# Patient Record
Sex: FEMALE | Race: WHITE | NOT HISPANIC OR LATINO | URBAN - NONMETROPOLITAN AREA
[De-identification: names, ages, dates, MRNs, and addresses within clinical notes are randomized per-mention and may not be internally consistent; named-entity substitution may affect disease eponyms.]

---

## 2017-11-17 NOTE — PATIENT DISCUSSION
(E11.9) Type 2 diabetes mellitus without complications - Assesment : Examination reveals Type 2 Diabetes mellitus without ocular complications. - Plan : Continue following with PCP for routine care. Stressed importance of keeping A1c levels below 7.0 and monitoring blood sugar. Letter explaining today's findings faxed to patient's PCP. RV 1 Year for Complete Exam, or sooner if having problems or changes in vision.

## 2017-11-17 NOTE — PATIENT DISCUSSION
(H02.411) Mechanical ptosis of right eyelid - Assesment : Examination revealed mechanical ptosis of the eyelid. - Plan : Monitor for changes.

## 2017-11-17 NOTE — PATIENT DISCUSSION
(H02.412) Mechanical ptosis of left eyelid - Assesment : Examination revealed mechanical ptosis of the eyelid. - Plan : Monitor for changes.

## 2018-02-09 PROBLEM — H25.13: Noted: 2019-02-15

## 2018-02-09 PROBLEM — H52.03: Noted: 2018-02-09

## 2018-02-09 PROBLEM — H52.4: Noted: 2018-02-09

## 2018-02-09 PROBLEM — H52.223: Noted: 2018-02-09

## 2019-01-28 NOTE — PATIENT DISCUSSION
(E11.9) Type 2 diabetes mellitus without complications - Assesment : Examination reveals Type 2 Diabetes mellitus without ocular complications. NO DIABETIC RETINOPATHY PRESENT TODAY - Plan : Continue following with PCP for routine care. Stressed importance of keeping A1c levels below 7.0 and monitoring blood sugar.  Letter explaining today's findings faxed to patient's PCP.    1 YEAR EXAM

## 2019-01-28 NOTE — PATIENT DISCUSSION
(H01.001) Unspecified blepharitis right upper eyelid - Assesment : Examination revealed Blepharitis. - Plan : Warm soaks with massage to eyelid two times daily.  LID [employer]ER

## 2019-01-28 NOTE — PATIENT DISCUSSION
(Z77.620) Other secondary cataract, left eye - Assesment : Significant posterior capsule opacification present. - Plan : Monitor for Changes. Advised patient to call our office with decreased vision or increased symptoms.

## 2019-01-28 NOTE — PATIENT DISCUSSION
(H01.004) Unspecified blepharitis left upper eyelid - Assesment : Examination revealed Blepharitis. - Plan : Warm soaks with massage to eyelid two times daily.  SEE PLAN 2

## 2019-02-15 ENCOUNTER — IMPORTED ENCOUNTER (OUTPATIENT)
Dept: URBAN - NONMETROPOLITAN AREA CLINIC 1 | Facility: CLINIC | Age: 64
End: 2019-02-15

## 2019-02-15 PROCEDURE — 92310 CONTACT LENS FITTING OU: CPT

## 2019-02-15 PROCEDURE — S0621 ROUTINE OPHTHALMOLOGICAL EXA: HCPCS

## 2019-02-15 NOTE — PATIENT DISCUSSION
Hyperopia/astig/presby-  Discussed refractive status in detail w/ pt today-  New GLRx given today-  Previous hx of RK surgery with incisions noted. -  CL trials will be ordered and she will follow up with Ashley to complete fitting once trials are here.  -  Continue to monitor Cataracts OU mild-  Discussed diagnosis in detail with patient-  Discussed signs and symptoms associated-  Recommend UV protection -  Not visually significant at this time-  Continue to monitor; 's Notes: MR  11/15/16DFE  11/15/16

## 2019-02-27 ENCOUNTER — IMPORTED ENCOUNTER (OUTPATIENT)
Dept: URBAN - NONMETROPOLITAN AREA CLINIC 1 | Facility: CLINIC | Age: 64
End: 2019-02-27

## 2019-08-13 ENCOUNTER — IMPORTED ENCOUNTER (OUTPATIENT)
Dept: URBAN - NONMETROPOLITAN AREA CLINIC 1 | Facility: CLINIC | Age: 64
End: 2019-08-13

## 2019-08-13 PROBLEM — H52.4: Noted: 2019-08-13

## 2019-08-13 PROBLEM — H00.025: Noted: 2019-08-13

## 2019-08-13 PROBLEM — H25.13: Noted: 2019-08-13

## 2019-08-13 PROBLEM — H52.223: Noted: 2019-08-13

## 2019-08-13 PROBLEM — H52.03: Noted: 2019-08-13

## 2019-08-13 PROCEDURE — 99213 OFFICE O/P EST LOW 20 MIN: CPT

## 2019-08-13 NOTE — PATIENT DISCUSSION
Internal Hordeolum LLL - Discussed diagnosis in detail with patient - Start HOT compresses 10-15 mins on and 45 mins off- Start Keflex 500mg TID x 7 days. [facility]- If no improvement will derricker neda carreon with Dr. Ibeth Chambers - Continue to monitor ---------------------------previous notes------------------------Hyperopia/astig/presby-  Discussed refractive status in detail w/ pt today-  New GLRx given today-  Previous hx of RK surgery with incisions noted. -  CL trials will be ordered and she will follow up with Ashley to complete fitting once trials are here.  -  Continue to monitor Cataracts OU mild-  Discussed diagnosis in detail with patient-  Discussed signs and symptoms associated-  Recommend UV protection -  Not visually significant at this time-  Continue to monitor; 's Notes: MR  11/15/16DFE  11/15/16

## 2020-09-08 ENCOUNTER — IMPORTED ENCOUNTER (OUTPATIENT)
Dept: URBAN - NONMETROPOLITAN AREA CLINIC 1 | Facility: CLINIC | Age: 65
End: 2020-09-08

## 2020-09-08 PROBLEM — H25.813: Noted: 2020-09-08

## 2020-09-08 PROBLEM — H52.4: Noted: 2020-09-08

## 2020-09-08 PROCEDURE — 92015 DETERMINE REFRACTIVE STATE: CPT

## 2020-09-08 PROCEDURE — 92014 COMPRE OPH EXAM EST PT 1/>: CPT

## 2020-09-08 PROCEDURE — 92310 CONTACT LENS FITTING OU: CPT

## 2020-09-08 NOTE — PATIENT DISCUSSION
Hyperopia/astig/presby- Discussed refractive status in detail w/ pt today- New glasses and CL RX given today MR re-checked by Dr. Idolina Nissen today- Previous hx of RK surgery with incisions noted.  - Continue to monitor Cataracts OU- Discussed diagnosis in detail with patient- Discussed signs and symptoms associated- Recommend UV protection - No treatment needed at this time - Continue to monitor; Dr's Notes: MR  11/15/16DFNELI  11/15/16

## 2021-05-24 ENCOUNTER — IMPORTED ENCOUNTER (OUTPATIENT)
Dept: URBAN - NONMETROPOLITAN AREA CLINIC 1 | Facility: CLINIC | Age: 66
End: 2021-05-24

## 2021-05-24 PROBLEM — H25.813: Noted: 2021-05-24

## 2021-05-24 PROBLEM — H53.2: Noted: 2021-05-24

## 2021-05-24 PROBLEM — H52.4: Noted: 2021-05-24

## 2021-05-24 PROCEDURE — 99213 OFFICE O/P EST LOW 20 MIN: CPT

## 2021-05-24 NOTE — PATIENT DISCUSSION
Intermittent Diplopia - Discussed diagnosis in detail with patient - Patient states that she had a CT scan back in Feb and was normal due to having fainting spells and then again with in the past week with veritcal double vision  Patient was diagnosed with 8 mm meningioma along the lateral frontoparietal convexity no mass effect or vasogenic effect- Patient has recently had MRI to rule out stroke and results were normal- OCT ONH and MAC done today WNL OU - Patient has a follow up appointment with her GP (Porsche Chacon) today at 2:00- Explained to patient that todays exam appears to be normal - Continue to monitor PREVIOUS NOTES Hyperopia/astig/presby- Discussed refractive status in detail w/ pt today- New glasses and CL RX given today MR re-checked by Dr. Oziel Valdez today- Previous hx of RK surgery with incisions noted.  - Continue to monitor Cataracts OU- Discussed diagnosis in detail with patient- Discussed signs and symptoms associated- Recommend UV protection - No treatment needed at this time - Continue to monitor; Dr's Notes:   11/15/16DFNELI  11/15/16

## 2021-09-14 ENCOUNTER — IMPORTED ENCOUNTER (OUTPATIENT)
Dept: URBAN - NONMETROPOLITAN AREA CLINIC 1 | Facility: CLINIC | Age: 66
End: 2021-09-14

## 2021-09-14 PROBLEM — H53.2: Noted: 2021-09-14

## 2021-09-14 PROBLEM — H25.813: Noted: 2021-09-14

## 2021-09-14 PROBLEM — H52.4: Noted: 2021-09-14

## 2021-09-14 PROBLEM — H25.812: Noted: 2021-09-14

## 2021-09-14 PROBLEM — H25.811: Noted: 2021-09-14

## 2021-09-14 PROCEDURE — 92014 COMPRE OPH EXAM EST PT 1/>: CPT

## 2021-09-14 PROCEDURE — 92310 CONTACT LENS FITTING OU: CPT

## 2021-09-14 PROCEDURE — 92015 DETERMINE REFRACTIVE STATE: CPT

## 2021-09-14 NOTE — PATIENT DISCUSSION
Hyperopia/astig/presby- Discussed refractive status in detail w/ pt today- New glasses and CL RX given today MR re-checked by Dr. Juan Francisco Syed today- Previous hx of RK surgery with incisions noted. - Continue to monitor Cataracts OU- Discussed diagnosis in detail with patient- Discussed signs and symptoms associated- Recommend UV protection - No treatment needed at this time - Continue to monitor PREVIOUS NOTES Intermittent Diplopia - Discussed diagnosis in detail with patient - Patient states that she had a CT scan back in Feb and was normal due to having fainting spells  Patient was diagnosed with 8 mm meningioma along the lateral frontoparietal convexity no mass effect or vasogenic effect- Patient has recently had MRI to rule out stroke and results were normal- OCT ONH and MAC done previously WNL OU 9/14/21***- Patient has been followed by Dr. Eugenio Hsieh at [facility] - Patient states that she has had a CT scan and meninginoma is stable.  Patient states that are running more tests on her - She reports no more double vision at this time - Continue to monitor; 's Notes: MR  11/15/16DFE  11/15/16

## 2022-04-09 ASSESSMENT — VISUAL ACUITY
OS_SC: 20/25-
OS_SC: 20/40
OD_SC: 20/30+2
OU_CC: 20/25
OD_SC: 20/25
OS_SC: 20/40
OU_SC: 20/25
OD_SC: 20/22-
OD_SC: 20/30-2
OS_SC: 20/40
OD_SC: 20/30+2
OS_SC: 20/30-2
OD_SC: 20/30+2
OU_SC: 20/25
OS_PH: 20/25
OU_SC: 20/25
OS_SC: 20/40
OD_SC: 20/30+2
OU_SC: 20/25
OD_SC: 20/30+2
OS_SC: 20/30-2
OS_SC: 20/25

## 2022-04-09 ASSESSMENT — TONOMETRY
OD_IOP_MMHG: 14
OD_IOP_MMHG: 14
OD_IOP_MMHG: 15
OS_IOP_MMHG: 14
OD_IOP_MMHG: 13
OS_IOP_MMHG: 17
OS_IOP_MMHG: 14
OS_IOP_MMHG: 16
OS_IOP_MMHG: 15
OD_IOP_MMHG: 16

## 2022-05-02 ENCOUNTER — ESTABLISHED PATIENT (OUTPATIENT)
Dept: URBAN - NONMETROPOLITAN AREA CLINIC 1 | Facility: CLINIC | Age: 67
End: 2022-05-02

## 2022-05-02 DIAGNOSIS — H11.32: ICD-10-CM

## 2022-05-02 PROCEDURE — 99213 OFFICE O/P EST LOW 20 MIN: CPT

## 2022-05-02 ASSESSMENT — VISUAL ACUITY
OS_CC: 20/29
OD_CC: 20/22

## 2022-05-02 ASSESSMENT — TONOMETRY
OD_IOP_MMHG: 11
OS_IOP_MMHG: 11

## 2022-05-02 NOTE — PATIENT DISCUSSION
Explained there was no foreign body as she kept stating that she felt something. Explained it could be dryness, which sometimes presents itself as a gritty sensation.

## 2022-05-02 NOTE — PATIENT DISCUSSION
Hyperopia/astig/presby- Discussed refractive status in detail w/ pt today- New glasses and CL RX given today MR re-checked by Dr. Felicia Choudhary today- Previous hx of RK surgery with incisions noted. - Continue to monitor Cataracts OU- Discussed diagnosis in detail with patient- Discussed signs and symptoms associated- Recommend UV protection - No treatment needed at this time - Continue to monitor PREVIOUS NOTES Intermittent Diplopia - Discussed diagnosis in detail with patient - Patient states that she had a CT scan back in Feb and was normal due to having fainting spells  Patient was diagnosed with 8 mm meningioma along the lateral frontoparietal convexity no mass effect or vasogenic effect- Patient has recently had MRI to rule out stroke and results were normal- OCT ONH and MAC done previously WNL OU 9/14/21***- Patient has been followed by Dr. Drew Emery at [facility] - Patient states that she has had a CT scan and meninginoma is stable. Patient states that are running more tests on her - She reports no more double vision at this time - Continue to monitor; 's Notes: MR  11/15/16DFE  11/15/16.

## 2022-05-02 NOTE — PATIENT DISCUSSION
No treatment indicated. Reassurance given, increase artificial tears. Samples of Refresh given to use PRN.  Limit eye rubbing, coughing fits, etc.

## 2022-10-10 ENCOUNTER — COMPREHENSIVE EXAM (OUTPATIENT)
Dept: URBAN - NONMETROPOLITAN AREA CLINIC 1 | Facility: CLINIC | Age: 67
End: 2022-10-10

## 2022-10-10 DIAGNOSIS — H52.4: ICD-10-CM

## 2022-10-10 DIAGNOSIS — H43.813: ICD-10-CM

## 2022-10-10 PROCEDURE — 92014 COMPRE OPH EXAM EST PT 1/>: CPT

## 2022-10-10 PROCEDURE — 92015 DETERMINE REFRACTIVE STATE: CPT

## 2022-10-10 ASSESSMENT — VISUAL ACUITY
OS_CC: 20/30
OD_CC: 20/20-2

## 2022-10-10 ASSESSMENT — TONOMETRY
OD_IOP_MMHG: 13
OS_IOP_MMHG: 14

## 2022-10-10 NOTE — PATIENT DISCUSSION
Due to the fact that we are doing cataract evaluation soon, we decided not to do CL eval at this time and she will continue with glasses until after CE OU.

## 2022-10-10 NOTE — PATIENT DISCUSSION
Hx of Intermittent Diplopia. Discussed diagnosis in detail with patient. Patient states that she had a CT scan back in February 2021 and was normal due to having fainting spells. Patient was diagnosed with an 8mm meningioma along the lateral frontoparietal convexity, no mass effect or vasogenic effect. Patient has had MRI to rule out stroke and results were normal. Previous OCT ONH and MAC done and was within normal limits in September 2021. Patient has been followed by Dr. Aixa Owen at [facility]. Patient states that she has had a CT scan and meninginoma is stable. Continue to monitor.

## 2023-06-28 ENCOUNTER — FOLLOW UP (OUTPATIENT)
Dept: URBAN - NONMETROPOLITAN AREA CLINIC 1 | Facility: CLINIC | Age: 68
End: 2023-06-28

## 2023-06-28 DIAGNOSIS — H43.813: ICD-10-CM

## 2023-06-28 DIAGNOSIS — H26.493: ICD-10-CM

## 2023-06-28 DIAGNOSIS — Z96.1: ICD-10-CM

## 2023-06-28 PROCEDURE — 99213 OFFICE O/P EST LOW 20 MIN: CPT

## 2023-06-28 PROCEDURE — 92499OP2 OPTOMAP RETINAL SCREENING BOTH EYES

## 2023-06-28 ASSESSMENT — VISUAL ACUITY
OD_SC: 20/25-2
OU_SC: 20/30
OS_PH: 20/25
OS_SC: 20/40

## 2023-06-28 ASSESSMENT — TONOMETRY
OS_IOP_MMHG: 13
OD_IOP_MMHG: 13

## 2024-06-06 ENCOUNTER — ESTABLISHED PATIENT (OUTPATIENT)
Dept: URBAN - NONMETROPOLITAN AREA CLINIC 1 | Facility: CLINIC | Age: 69
End: 2024-06-06

## 2024-06-06 DIAGNOSIS — H52.4: ICD-10-CM

## 2024-06-06 PROCEDURE — 92014 COMPRE OPH EXAM EST PT 1/>: CPT

## 2024-06-06 PROCEDURE — 92015 DETERMINE REFRACTIVE STATE: CPT

## 2024-06-06 ASSESSMENT — VISUAL ACUITY
OU_SC: 20/25-2
OS_PH: 20/25-2
OD_SC: 20/25-2
OS_SC: 20/40-2

## 2024-06-06 ASSESSMENT — TONOMETRY
OD_IOP_MMHG: 16
OS_IOP_MMHG: 16

## 2025-08-05 ENCOUNTER — COMPREHENSIVE EXAM (OUTPATIENT)
Age: 70
End: 2025-08-05

## 2025-08-05 DIAGNOSIS — H52.4: ICD-10-CM

## 2025-08-05 PROCEDURE — 92015 DETERMINE REFRACTIVE STATE: CPT

## 2025-08-05 PROCEDURE — 92014 COMPRE OPH EXAM EST PT 1/>: CPT
